# Patient Record
Sex: FEMALE | Race: WHITE | NOT HISPANIC OR LATINO | URBAN - METROPOLITAN AREA
[De-identification: names, ages, dates, MRNs, and addresses within clinical notes are randomized per-mention and may not be internally consistent; named-entity substitution may affect disease eponyms.]

---

## 2019-11-01 ENCOUNTER — EMERGENCY (EMERGENCY)
Facility: HOSPITAL | Age: 34
LOS: 1 days | Discharge: HOME | End: 2019-11-01
Attending: EMERGENCY MEDICINE | Admitting: EMERGENCY MEDICINE
Payer: COMMERCIAL

## 2019-11-01 VITALS
TEMPERATURE: 98 F | WEIGHT: 136.69 LBS | HEART RATE: 77 BPM | RESPIRATION RATE: 16 BRPM | DIASTOLIC BLOOD PRESSURE: 75 MMHG | SYSTOLIC BLOOD PRESSURE: 134 MMHG | OXYGEN SATURATION: 100 % | HEIGHT: 63 IN

## 2019-11-01 LAB
ALBUMIN SERPL ELPH-MCNC: 4.5 G/DL — SIGNIFICANT CHANGE UP (ref 3.4–5)
ALP SERPL-CCNC: 77 U/L — SIGNIFICANT CHANGE UP (ref 40–120)
ALT FLD-CCNC: 14 U/L — SIGNIFICANT CHANGE UP (ref 12–42)
ANION GAP SERPL CALC-SCNC: 10 MMOL/L — SIGNIFICANT CHANGE UP (ref 9–16)
AST SERPL-CCNC: 14 U/L — LOW (ref 15–37)
BASOPHILS # BLD AUTO: 0.05 K/UL — SIGNIFICANT CHANGE UP (ref 0–0.2)
BASOPHILS NFR BLD AUTO: 0.6 % — SIGNIFICANT CHANGE UP (ref 0–2)
BILIRUB SERPL-MCNC: 0.3 MG/DL — SIGNIFICANT CHANGE UP (ref 0.2–1.2)
BUN SERPL-MCNC: 10 MG/DL — SIGNIFICANT CHANGE UP (ref 7–23)
CALCIUM SERPL-MCNC: 10.5 MG/DL — SIGNIFICANT CHANGE UP (ref 8.5–10.5)
CHLORIDE SERPL-SCNC: 104 MMOL/L — SIGNIFICANT CHANGE UP (ref 96–108)
CO2 SERPL-SCNC: 27 MMOL/L — SIGNIFICANT CHANGE UP (ref 22–31)
CREAT SERPL-MCNC: 0.66 MG/DL — SIGNIFICANT CHANGE UP (ref 0.5–1.3)
EOSINOPHIL # BLD AUTO: 0.21 K/UL — SIGNIFICANT CHANGE UP (ref 0–0.5)
EOSINOPHIL NFR BLD AUTO: 2.6 % — SIGNIFICANT CHANGE UP (ref 0–6)
GLUCOSE SERPL-MCNC: 94 MG/DL — SIGNIFICANT CHANGE UP (ref 70–99)
HCG SERPL-ACNC: 67 MIU/ML — HIGH
HCT VFR BLD CALC: 39.5 % — SIGNIFICANT CHANGE UP (ref 34.5–45)
HGB BLD-MCNC: 13 G/DL — SIGNIFICANT CHANGE UP (ref 11.5–15.5)
IMM GRANULOCYTES NFR BLD AUTO: 0.4 % — SIGNIFICANT CHANGE UP (ref 0–1.5)
LYMPHOCYTES # BLD AUTO: 2.76 K/UL — SIGNIFICANT CHANGE UP (ref 1–3.3)
LYMPHOCYTES # BLD AUTO: 34.2 % — SIGNIFICANT CHANGE UP (ref 13–44)
MCHC RBC-ENTMCNC: 30 PG — SIGNIFICANT CHANGE UP (ref 27–34)
MCHC RBC-ENTMCNC: 32.9 GM/DL — SIGNIFICANT CHANGE UP (ref 32–36)
MCV RBC AUTO: 91.2 FL — SIGNIFICANT CHANGE UP (ref 80–100)
MONOCYTES # BLD AUTO: 0.5 K/UL — SIGNIFICANT CHANGE UP (ref 0–0.9)
MONOCYTES NFR BLD AUTO: 6.2 % — SIGNIFICANT CHANGE UP (ref 2–14)
NEUTROPHILS # BLD AUTO: 4.52 K/UL — SIGNIFICANT CHANGE UP (ref 1.8–7.4)
NEUTROPHILS NFR BLD AUTO: 56 % — SIGNIFICANT CHANGE UP (ref 43–77)
NRBC # BLD: 0 /100 WBCS — SIGNIFICANT CHANGE UP (ref 0–0)
PLATELET # BLD AUTO: 209 K/UL — SIGNIFICANT CHANGE UP (ref 150–400)
POTASSIUM SERPL-MCNC: 3.1 MMOL/L — LOW (ref 3.5–5.3)
POTASSIUM SERPL-SCNC: 3.1 MMOL/L — LOW (ref 3.5–5.3)
PROT SERPL-MCNC: 8.5 G/DL — HIGH (ref 6.4–8.2)
RBC # BLD: 4.33 M/UL — SIGNIFICANT CHANGE UP (ref 3.8–5.2)
RBC # FLD: 12.4 % — SIGNIFICANT CHANGE UP (ref 10.3–14.5)
SODIUM SERPL-SCNC: 141 MMOL/L — SIGNIFICANT CHANGE UP (ref 132–145)
WBC # BLD: 8.07 K/UL — SIGNIFICANT CHANGE UP (ref 3.8–10.5)
WBC # FLD AUTO: 8.07 K/UL — SIGNIFICANT CHANGE UP (ref 3.8–10.5)

## 2019-11-01 PROCEDURE — 76815 OB US LIMITED FETUS(S): CPT | Mod: 26

## 2019-11-01 PROCEDURE — 76817 TRANSVAGINAL US OBSTETRIC: CPT | Mod: 26

## 2019-11-01 PROCEDURE — 99284 EMERGENCY DEPT VISIT MOD MDM: CPT

## 2019-11-01 RX ORDER — POTASSIUM CHLORIDE 20 MEQ
60 PACKET (EA) ORAL ONCE
Refills: 0 | Status: COMPLETED | OUTPATIENT
Start: 2019-11-01 | End: 2019-11-01

## 2019-11-01 RX ADMIN — Medication 60 MILLIEQUIVALENT(S): at 22:32

## 2019-11-01 NOTE — ED PROVIDER NOTE - OBJECTIVE STATEMENT
34 yof pw vaginal bleeding since .  LMP ~4-5 wk ago.  pt spoke to her OBGYN, had labs done Monday and Wednesday, noted uptrending hcg but not normal trending, and progesterone level is lower than expected.  pt noted more bleeding w/ clots today.  minimal cramping.  .

## 2019-11-01 NOTE — ED PROVIDER NOTE - PHYSICAL EXAMINATION
Physical Exam  GEN: Awake, alert, non-toxic appearing,   EYES: full EOMI,  ENT: External inspection normal, normal voice,   HEAD: atraumatic  NECK: FROM neck, supple, no meningismus,   RESP: no tachypnea, no hypoxia, no resp distress,  GI:   MSK: FROM all 4 extremities Physical Exam  GEN: Awake, alert, non-toxic appearing,   EYES: full EOMI,  ENT: External inspection normal, normal voice,   HEAD: atraumatic  NECK: FROM neck, supple, no meningismus,   RESP: no tachypnea, no hypoxia, no resp distress,  : chaperoned by RN, small amount of blood noted in vaginal vault, unable to feel OS, no clots on exam, no tenderness  MSK: FROM all 4 extremities

## 2019-11-01 NOTE — ED PROVIDER NOTE - PATIENT PORTAL LINK FT
Left message on machine for patient to return call regarding message below.    Patient will come to this  office to : prescription and form.   Patient was advised of location and hours: Yes.   Patient was advised to bring photo ID: Yes.   Patient elects another party to  item: no.   LMOVM         You can access the FollowMyHealth Patient Portal offered by [facility] by registering at the following website: [email or web address]. By joining [employer]’s FollowMyHealth portal, you will also be able to view your health information using other applications (apps) compatible with our system.

## 2019-11-01 NOTE — ED PROVIDER NOTE - NSFOLLOWUPINSTRUCTIONS_ED_ALL_ED_FT
Follow up with your OBGYN Monday for a repeat lab draw or come to us for repeat lab test (hcg)  Please show a copy of today's result (lab and ultrasound) to your OBGYN  We gave you 60mEq of potassium chloride  Return immediately for any new or worsening symptoms or any new concerns

## 2019-11-01 NOTE — ED ADULT NURSE NOTE - CHIEF COMPLAINT QUOTE
vaginal bleeding since Sunday as per patient she had an early miscarriage and has been under the care of her OBGYN Dr Duke who suggested she come to the ED because she had increased bleeding today. Denies dizziness sob

## 2019-11-01 NOTE — ED ADULT NURSE NOTE - NSIMPLEMENTINTERV_GEN_ALL_ED
Implemented All Universal Safety Interventions:  Blanding to call system. Call bell, personal items and telephone within reach. Instruct patient to call for assistance. Room bathroom lighting operational. Non-slip footwear when patient is off stretcher. Physically safe environment: no spills, clutter or unnecessary equipment. Stretcher in lowest position, wheels locked, appropriate side rails in place.

## 2019-11-02 LAB
BLD GP AB SCN SERPL QL: NEGATIVE — SIGNIFICANT CHANGE UP
BLD GP AB SCN SERPL QL: NEGATIVE — SIGNIFICANT CHANGE UP
RH IG SCN BLD-IMP: POSITIVE — SIGNIFICANT CHANGE UP
RH IG SCN BLD-IMP: POSITIVE — SIGNIFICANT CHANGE UP

## 2019-11-06 DIAGNOSIS — O03.9 COMPLETE OR UNSPECIFIED SPONTANEOUS ABORTION WITHOUT COMPLICATION: ICD-10-CM

## 2019-11-06 DIAGNOSIS — N93.9 ABNORMAL UTERINE AND VAGINAL BLEEDING, UNSPECIFIED: ICD-10-CM

## 2019-11-06 DIAGNOSIS — E87.6 HYPOKALEMIA: ICD-10-CM

## 2020-10-23 ENCOUNTER — INPATIENT (INPATIENT)
Facility: HOSPITAL | Age: 35
LOS: 0 days | Discharge: HOME | End: 2020-10-24
Attending: OBSTETRICS & GYNECOLOGY | Admitting: OBSTETRICS & GYNECOLOGY
Payer: COMMERCIAL

## 2020-10-23 VITALS — WEIGHT: 154.32 LBS | HEIGHT: 63 IN

## 2020-10-23 DIAGNOSIS — O26.899 OTHER SPECIFIED PREGNANCY RELATED CONDITIONS, UNSPECIFIED TRIMESTER: ICD-10-CM

## 2020-10-23 DIAGNOSIS — Z3A.00 WEEKS OF GESTATION OF PREGNANCY NOT SPECIFIED: ICD-10-CM

## 2020-10-23 LAB
BASOPHILS # BLD AUTO: 0.03 K/UL — SIGNIFICANT CHANGE UP (ref 0–0.2)
BASOPHILS NFR BLD AUTO: 0.2 % — SIGNIFICANT CHANGE UP (ref 0–2)
EOSINOPHIL # BLD AUTO: 0.04 K/UL — SIGNIFICANT CHANGE UP (ref 0–0.5)
EOSINOPHIL NFR BLD AUTO: 0.3 % — SIGNIFICANT CHANGE UP (ref 0–6)
HCT VFR BLD CALC: 38.6 % — SIGNIFICANT CHANGE UP (ref 34.5–45)
HGB BLD-MCNC: 12.5 G/DL — SIGNIFICANT CHANGE UP (ref 11.5–15.5)
IMM GRANULOCYTES NFR BLD AUTO: 0.6 % — SIGNIFICANT CHANGE UP (ref 0–1.5)
LYMPHOCYTES # BLD AUTO: 1.75 K/UL — SIGNIFICANT CHANGE UP (ref 1–3.3)
LYMPHOCYTES # BLD AUTO: 13.8 % — SIGNIFICANT CHANGE UP (ref 13–44)
MCHC RBC-ENTMCNC: 30.3 PG — SIGNIFICANT CHANGE UP (ref 27–34)
MCHC RBC-ENTMCNC: 32.4 GM/DL — SIGNIFICANT CHANGE UP (ref 32–36)
MCV RBC AUTO: 93.7 FL — SIGNIFICANT CHANGE UP (ref 80–100)
MONOCYTES # BLD AUTO: 0.6 K/UL — SIGNIFICANT CHANGE UP (ref 0–0.9)
MONOCYTES NFR BLD AUTO: 4.7 % — SIGNIFICANT CHANGE UP (ref 2–14)
NEUTROPHILS # BLD AUTO: 10.17 K/UL — HIGH (ref 1.8–7.4)
NEUTROPHILS NFR BLD AUTO: 80.4 % — HIGH (ref 43–77)
NRBC # BLD: 0 /100 WBCS — SIGNIFICANT CHANGE UP (ref 0–0)
PLATELET # BLD AUTO: 136 K/UL — LOW (ref 150–400)
RBC # BLD: 4.12 M/UL — SIGNIFICANT CHANGE UP (ref 3.8–5.2)
RBC # FLD: 14.3 % — SIGNIFICANT CHANGE UP (ref 10.3–14.5)
SARS-COV-2 IGG SERPL QL IA: NEGATIVE — SIGNIFICANT CHANGE UP
SARS-COV-2 IGM SERPL IA-ACNC: <0.1 INDEX — SIGNIFICANT CHANGE UP
SARS-COV-2 RNA SPEC QL NAA+PROBE: SIGNIFICANT CHANGE UP
T PALLIDUM AB TITR SER: NEGATIVE — SIGNIFICANT CHANGE UP
WBC # BLD: 12.66 K/UL — HIGH (ref 3.8–10.5)
WBC # FLD AUTO: 12.66 K/UL — HIGH (ref 3.8–10.5)

## 2020-10-23 PROCEDURE — 88307 TISSUE EXAM BY PATHOLOGIST: CPT | Mod: 26

## 2020-10-23 RX ORDER — OXYCODONE HYDROCHLORIDE 5 MG/1
5 TABLET ORAL
Refills: 0 | Status: DISCONTINUED | OUTPATIENT
Start: 2020-10-23 | End: 2020-10-24

## 2020-10-23 RX ORDER — OXYTOCIN 10 UNIT/ML
333.33 VIAL (ML) INJECTION
Qty: 20 | Refills: 0 | Status: DISCONTINUED | OUTPATIENT
Start: 2020-10-23 | End: 2020-10-23

## 2020-10-23 RX ORDER — DIBUCAINE 1 %
1 OINTMENT (GRAM) RECTAL EVERY 6 HOURS
Refills: 0 | Status: DISCONTINUED | OUTPATIENT
Start: 2020-10-23 | End: 2020-10-24

## 2020-10-23 RX ORDER — FENTANYL/BUPIVACAINE/NS/PF 2MCG/ML-.1
250 PLASTIC BAG, INJECTION (ML) INJECTION
Refills: 0 | Status: DISCONTINUED | OUTPATIENT
Start: 2020-10-23 | End: 2020-10-24

## 2020-10-23 RX ORDER — DIPHENHYDRAMINE HCL 50 MG
25 CAPSULE ORAL EVERY 6 HOURS
Refills: 0 | Status: DISCONTINUED | OUTPATIENT
Start: 2020-10-23 | End: 2020-10-24

## 2020-10-23 RX ORDER — IBUPROFEN 200 MG
600 TABLET ORAL EVERY 6 HOURS
Refills: 0 | Status: DISCONTINUED | OUTPATIENT
Start: 2020-10-23 | End: 2020-10-24

## 2020-10-23 RX ORDER — SODIUM CHLORIDE 9 MG/ML
3 INJECTION INTRAMUSCULAR; INTRAVENOUS; SUBCUTANEOUS EVERY 8 HOURS
Refills: 0 | Status: DISCONTINUED | OUTPATIENT
Start: 2020-10-23 | End: 2020-10-24

## 2020-10-23 RX ORDER — IBUPROFEN 200 MG
600 TABLET ORAL EVERY 6 HOURS
Refills: 0 | Status: COMPLETED | OUTPATIENT
Start: 2020-10-23 | End: 2021-09-21

## 2020-10-23 RX ORDER — PRAMOXINE HYDROCHLORIDE 150 MG/15G
1 AEROSOL, FOAM RECTAL EVERY 4 HOURS
Refills: 0 | Status: DISCONTINUED | OUTPATIENT
Start: 2020-10-23 | End: 2020-10-24

## 2020-10-23 RX ORDER — KETOROLAC TROMETHAMINE 30 MG/ML
30 SYRINGE (ML) INJECTION ONCE
Refills: 0 | Status: DISCONTINUED | OUTPATIENT
Start: 2020-10-23 | End: 2020-10-23

## 2020-10-23 RX ORDER — SODIUM CHLORIDE 9 MG/ML
1000 INJECTION, SOLUTION INTRAVENOUS
Refills: 0 | Status: DISCONTINUED | OUTPATIENT
Start: 2020-10-23 | End: 2020-10-23

## 2020-10-23 RX ORDER — LANOLIN
1 OINTMENT (GRAM) TOPICAL EVERY 6 HOURS
Refills: 0 | Status: DISCONTINUED | OUTPATIENT
Start: 2020-10-23 | End: 2020-10-24

## 2020-10-23 RX ORDER — SIMETHICONE 80 MG/1
80 TABLET, CHEWABLE ORAL EVERY 4 HOURS
Refills: 0 | Status: DISCONTINUED | OUTPATIENT
Start: 2020-10-23 | End: 2020-10-24

## 2020-10-23 RX ORDER — BENZOCAINE 10 %
1 GEL (GRAM) MUCOUS MEMBRANE EVERY 6 HOURS
Refills: 0 | Status: DISCONTINUED | OUTPATIENT
Start: 2020-10-23 | End: 2020-10-24

## 2020-10-23 RX ORDER — MAGNESIUM HYDROXIDE 400 MG/1
30 TABLET, CHEWABLE ORAL
Refills: 0 | Status: DISCONTINUED | OUTPATIENT
Start: 2020-10-23 | End: 2020-10-24

## 2020-10-23 RX ORDER — OXYCODONE HYDROCHLORIDE 5 MG/1
5 TABLET ORAL ONCE
Refills: 0 | Status: DISCONTINUED | OUTPATIENT
Start: 2020-10-23 | End: 2020-10-24

## 2020-10-23 RX ORDER — TETANUS TOXOID, REDUCED DIPHTHERIA TOXOID AND ACELLULAR PERTUSSIS VACCINE, ADSORBED 5; 2.5; 8; 8; 2.5 [IU]/.5ML; [IU]/.5ML; UG/.5ML; UG/.5ML; UG/.5ML
0.5 SUSPENSION INTRAMUSCULAR ONCE
Refills: 0 | Status: DISCONTINUED | OUTPATIENT
Start: 2020-10-23 | End: 2020-10-24

## 2020-10-23 RX ORDER — OXYTOCIN 10 UNIT/ML
333.33 VIAL (ML) INJECTION
Qty: 20 | Refills: 0 | Status: DISCONTINUED | OUTPATIENT
Start: 2020-10-23 | End: 2020-10-24

## 2020-10-23 RX ORDER — AER TRAVELER 0.5 G/1
1 SOLUTION RECTAL; TOPICAL EVERY 4 HOURS
Refills: 0 | Status: DISCONTINUED | OUTPATIENT
Start: 2020-10-23 | End: 2020-10-24

## 2020-10-23 RX ORDER — HYDROCORTISONE 1 %
1 OINTMENT (GRAM) TOPICAL EVERY 6 HOURS
Refills: 0 | Status: DISCONTINUED | OUTPATIENT
Start: 2020-10-23 | End: 2020-10-24

## 2020-10-23 RX ORDER — ACETAMINOPHEN 500 MG
975 TABLET ORAL
Refills: 0 | Status: DISCONTINUED | OUTPATIENT
Start: 2020-10-23 | End: 2020-10-24

## 2020-10-23 RX ORDER — CITRIC ACID/SODIUM CITRATE 300-500 MG
15 SOLUTION, ORAL ORAL ONCE
Refills: 0 | Status: DISCONTINUED | OUTPATIENT
Start: 2020-10-23 | End: 2020-10-23

## 2020-10-23 RX ADMIN — SODIUM CHLORIDE 3 MILLILITER(S): 9 INJECTION INTRAMUSCULAR; INTRAVENOUS; SUBCUTANEOUS at 23:24

## 2020-10-23 RX ADMIN — SODIUM CHLORIDE 3 MILLILITER(S): 9 INJECTION INTRAMUSCULAR; INTRAVENOUS; SUBCUTANEOUS at 14:21

## 2020-10-23 RX ADMIN — Medication 30 MILLIGRAM(S): at 14:21

## 2020-10-23 RX ADMIN — Medication 975 MILLIGRAM(S): at 22:35

## 2020-10-23 RX ADMIN — Medication 1000 MILLIUNIT(S)/MIN: at 13:19

## 2020-10-23 RX ADMIN — Medication 600 MILLIGRAM(S): at 23:24

## 2020-10-23 RX ADMIN — SODIUM CHLORIDE 125 MILLILITER(S): 9 INJECTION, SOLUTION INTRAVENOUS at 06:26

## 2020-10-23 RX ADMIN — Medication 30 MILLIGRAM(S): at 13:25

## 2020-10-23 RX ADMIN — Medication 975 MILLIGRAM(S): at 21:39

## 2020-10-23 NOTE — LACTATION INITIAL EVALUATION - NS LACT CON REASON FOR REQ
Infant about 10 hours old at time of consult. Mother is now P1. Infant is a 39.3 wk female delivered vaginally. Infant has voided but is due to stool. Mother stated that infant has latched and fed but becomes sleepy at the breast. Infant was due to eat at the time of this visit and was undressed down to the diaper. Assisted with latching infant via the cross cradle hold on the left breast. Infant observed to have a good latch with a wide gape, lips flanged and an organized, rhythmic suck. Demonstrated stimulating infant to suck when she became sleepy. Reviewed breastfeeding education. Emphasized importance of responding to feeding cues, importance of skin to skin and the importance of a proper latch. Demonstrated breast massage and hand expression and mother was able to return demo with colostrum noted. Reviewed pertinent information in the "A New Beginning" booklet. Mother has a breast pump for home use. Mother verbalized understanding of all information and denied having questions/concerns at this time. Informed about lactation availability.

## 2020-10-24 ENCOUNTER — TRANSCRIPTION ENCOUNTER (OUTPATIENT)
Age: 35
End: 2020-10-24

## 2020-10-24 VITALS
TEMPERATURE: 98 F | RESPIRATION RATE: 18 BRPM | OXYGEN SATURATION: 99 % | HEART RATE: 74 BPM | SYSTOLIC BLOOD PRESSURE: 110 MMHG | DIASTOLIC BLOOD PRESSURE: 78 MMHG

## 2020-10-24 PROCEDURE — 99214 OFFICE O/P EST MOD 30 MIN: CPT

## 2020-10-24 PROCEDURE — 85025 COMPLETE CBC W/AUTO DIFF WBC: CPT

## 2020-10-24 PROCEDURE — 86901 BLOOD TYPING SEROLOGIC RH(D): CPT

## 2020-10-24 PROCEDURE — 36415 COLL VENOUS BLD VENIPUNCTURE: CPT

## 2020-10-24 PROCEDURE — 86780 TREPONEMA PALLIDUM: CPT

## 2020-10-24 PROCEDURE — 86900 BLOOD TYPING SEROLOGIC ABO: CPT

## 2020-10-24 PROCEDURE — 86769 SARS-COV-2 COVID-19 ANTIBODY: CPT

## 2020-10-24 PROCEDURE — 88307 TISSUE EXAM BY PATHOLOGIST: CPT

## 2020-10-24 PROCEDURE — U0003: CPT

## 2020-10-24 PROCEDURE — 86850 RBC ANTIBODY SCREEN: CPT

## 2020-10-24 RX ORDER — IBUPROFEN 200 MG
1 TABLET ORAL
Qty: 0 | Refills: 0 | DISCHARGE
Start: 2020-10-24

## 2020-10-24 RX ORDER — ACETAMINOPHEN 500 MG
3 TABLET ORAL
Qty: 0 | Refills: 0 | DISCHARGE
Start: 2020-10-24

## 2020-10-24 RX ADMIN — Medication 600 MILLIGRAM(S): at 00:36

## 2020-10-24 RX ADMIN — Medication 600 MILLIGRAM(S): at 06:31

## 2020-10-24 RX ADMIN — SODIUM CHLORIDE 3 MILLILITER(S): 9 INJECTION INTRAMUSCULAR; INTRAVENOUS; SUBCUTANEOUS at 05:36

## 2020-10-24 RX ADMIN — Medication 600 MILLIGRAM(S): at 06:02

## 2020-10-24 NOTE — DISCHARGE NOTE OB - PATIENT PORTAL LINK FT
You can access the FollowMyHealth Patient Portal offered by [facility] by registering at the following website: [email or web address]. By joining [email or web address]’s FollowMyHealth portal, you will also be able to view your health information using other applications (apps) compatible with our system.

## 2020-10-24 NOTE — PROGRESS NOTE ADULT - SUBJECTIVE AND OBJECTIVE BOX
Patient is a 35y y.o. F now PPD #1 s/p .    NAEO. Patient was evaluated at bedside this AM. Pain is well-controlled with PO pain medications. She has been ambulating without difficulty and voiding spontaneously. She endorses decreasing vaginal bleeding.    Vital Signs Last 24 Hrs  T(C): 36.6 (24 Oct 2020 05:52), Max: 37.2 (23 Oct 2020 11:05)  T(F): 97.9 (24 Oct 2020 05:52), Max: 98.9 (23 Oct 2020 11:05)  HR: 62 (24 Oct 2020 05:52) (58 - 85)  BP: 109/66 (24 Oct 2020 05:52) (96/58 - 120/67)  BP(mean): --  RR: 17 (24 Oct 2020 05:52) (17 - 18)  SpO2: 96% (24 Oct 2020 05:52) (96% - 99%)    Physical Exam  Gen: Well-appearing. No acute distress. Resting comfortably in bed.  Resp: Breathing comfortably on RA.  Abd: Soft, non-tender, non-distended. Uterus firm at umbilicus.  Extremities: No calf tenderness.    Labs                          12.5   12.66 )-----------( 136      ( 23 Oct 2020 05:50 )             38.6                   10-22-20 @ 07:  -  10-23-20 @ 07:00  --------------------------------------------------------  IN: 2000 mL / OUT: 0 mL / NET: 2000 mL    10-23-20 @ 07:01  -  10-24-20 @ 06:02  --------------------------------------------------------  IN: 0 mL / OUT: 1850 mL / NET: -1850 mL

## 2020-10-24 NOTE — DISCHARGE NOTE OB - HOSPITAL COURSE
Pt is a 35yF s/p .  Please see delivery note for details.  During postpartum course patient's vitals were stable, vaginal bleeding appropriate, and pain well controlled.  On day of discharge patient was ambulating, pt had adequate oral intake, and was voiding freely.  Discharge instructions and precautions were given.  Will return to clinic in 4 weeks for postpartum visit.

## 2020-10-24 NOTE — DISCHARGE NOTE OB - MEDICATION SUMMARY - MEDICATIONS TO TAKE
I will START or STAY ON the medications listed below when I get home from the hospital:    acetaminophen 325 mg oral tablet  -- 3 tab(s) by mouth   -- Indication: For Pain    ibuprofen 600 mg oral tablet  -- 1 tab(s) by mouth every 6 hours  -- Indication: For Pain    Prenatal Multivitamins oral tablet  -- 1 tab(s) by mouth once a day  -- Indication: For Postpartum state

## 2020-10-24 NOTE — DISCHARGE NOTE OB - CARE PROVIDER_API CALL
Faye Batista)  Obstetrics and Gynecology  [address] [place] [id number]  Phone: [phone]  Fax: [phone]  Follow Up Time:

## 2020-10-24 NOTE — DISCHARGE NOTE OB - CARE PLAN
Principal Discharge DX:	Postpartum state  Goal:	Happy and healthy mom and baby!  Assessment and plan of treatment:	Please follow-up with your OB doctor within 4-6 weeks. You can resume a regular diet at home and you should continue your prenatal vitamins as directed. You can take Motrin 600mg by mouth every 6 hours for pain as needed.    Please place nothing in the vagina for 6 weeks (no tampons, no sex, no douching, no baths, no hot tubs, no swimming pools, etc). If you have severe headaches and/or vision changes, heavy bleeding, chest pain, or shortness of breath, please call your provider or go to the nearest ED. Call your OB with any signs of symptoms of infection including fever > 100.4 degrees, severe pain, malodorous vaginal discharge or heavy bleeding requiring more than 1-2 pads/hour.

## 2020-10-24 NOTE — PROGRESS NOTE ADULT - ASSESSMENT
Assessment/Plan    35y y.o. now PPD#1 s/p . AfVSS. Patient is progressing toward all postpartum milestones. Pain is well-controlled on PO medications. Anticipate discharge today or tomorrow.    1. Pain  - Well-controlled on Motrin and Tylenol ATC    2. GI  - Tolerating regular diet without n/v  - Senna PRN    3.   - Voiding spontaneously without difficulty/pain    4. DVT prophylaxis  - Encouraging ambulation    5. Dispo  - Anticipate dispo PPD#1        Isha Celeste MD PGY1

## 2020-10-28 DIAGNOSIS — Z3A.39 39 WEEKS GESTATION OF PREGNANCY: ICD-10-CM

## 2020-10-28 LAB — SURGICAL PATHOLOGY STUDY: SIGNIFICANT CHANGE UP

## 2021-01-19 NOTE — ED ADULT TRIAGE NOTE - HEART RATE (BEATS/MIN)
Pt to ED c/o chills, body aches, congestions, nausea, vomiting, sob, cough since last week, father dx covid last week. otc meds not working. Pt states a little blood in vomit.   77

## 2022-05-17 NOTE — ED ADULT NURSE NOTE - BREATHING, MLM
Problem: Plan of Care - These are the overarching goals to be used throughout the patient stay.    Goal: Plan of Care Review/Shift Note  Description: The Plan of Care Review/Shift note should be completed every shift.  The Outcome Evaluation is a brief statement about your assessment that the patient is improving, declining, or no change.  This information will be displayed automatically on your shift note.  Outcome: Adequate for Care Transition  Flowsheets (Taken 5/17/2022 1217)  Plan of Care Reviewed With:   patient   family  Goal: Absence of Hospital-Acquired Illness or Injury  Intervention: Identify and Manage Fall Risk  Recent Flowsheet Documentation  Taken 5/17/2022 1046 by Nadege Rosa, RN  Safety Promotion/Fall Prevention: activity supervised  Intervention: Prevent Skin Injury  Recent Flowsheet Documentation  Taken 5/17/2022 1046 by Nadege Rosa, RN  Body Position:   position maintained   position changed independently   Goal Outcome Evaluation:    Plan of Care Reviewed With: patient, family      Patient discharging today to [facility]. Had a BP 77/52 @ 1100 this morning, encourage fluids,  rechecked 11:30 114/66. Patient was asymptomatic. Daughter her and updated. They took patients discharge summary with them                 Spontaneous, unlabored and symmetrical

## 2024-01-26 ENCOUNTER — OFFICE (OUTPATIENT)
Dept: URBAN - METROPOLITAN AREA CLINIC 103 | Facility: CLINIC | Age: 39
Setting detail: OPHTHALMOLOGY
End: 2024-01-26
Payer: COMMERCIAL

## 2024-01-26 DIAGNOSIS — H16.042: ICD-10-CM

## 2024-01-26 PROCEDURE — 92012 INTRM OPH EXAM EST PATIENT: CPT | Performed by: OPHTHALMOLOGY

## 2024-01-26 ASSESSMENT — DRY EYES - PHYSICIAN NOTES: OD_GENERALCOMMENTS: INFERIOR

## 2024-01-26 ASSESSMENT — REFRACTION_CURRENTRX
OS_AXIS: 180
OS_SPHERE: -10.00
OS_CYLINDER: 0.00
OD_SPHERE: -9.00
OD_CYLINDER: -0.25
OS_VPRISM_DIRECTION: SV
OS_OVR_VA: 20/
OD_OVR_VA: 20/
OD_VPRISM_DIRECTION: SV
OD_AXIS: 152

## 2024-01-26 ASSESSMENT — REFRACTION_AUTOREFRACTION
OS_CYLINDER: -0.50
OS_SPHERE: -10.00
OD_SPHERE: -7.75
OS_AXIS: 155
OD_AXIS: 021
OD_CYLINDER: -0.25

## 2024-01-26 ASSESSMENT — SUPERFICIAL PUNCTATE KERATITIS (SPK)
OD_SPK: 1+
OS_SPK: ABSENT

## 2024-01-26 ASSESSMENT — CONFRONTATIONAL VISUAL FIELD TEST (CVF)
OD_FINDINGS: FULL
OS_FINDINGS: FULL

## 2024-01-26 ASSESSMENT — SPHEQUIV_DERIVED
OS_SPHEQUIV: -10.25
OD_SPHEQUIV: -7.875

## 2024-01-29 ENCOUNTER — RX ONLY (RX ONLY)
Age: 39
End: 2024-01-29

## 2024-01-29 ENCOUNTER — OFFICE (OUTPATIENT)
Dept: URBAN - METROPOLITAN AREA CLINIC 105 | Facility: CLINIC | Age: 39
Setting detail: OPHTHALMOLOGY
End: 2024-01-29
Payer: COMMERCIAL

## 2024-01-29 DIAGNOSIS — H16.042: ICD-10-CM

## 2024-01-29 PROCEDURE — 92012 INTRM OPH EXAM EST PATIENT: CPT | Performed by: OPHTHALMOLOGY

## 2024-01-29 ASSESSMENT — REFRACTION_CURRENTRX
OD_VPRISM_DIRECTION: SV
OS_VPRISM_DIRECTION: SV
OS_AXIS: 180
OD_AXIS: 152
OS_OVR_VA: 20/
OD_SPHERE: -9.00
OD_CYLINDER: -0.25
OD_OVR_VA: 20/
OS_CYLINDER: 0.00
OS_SPHERE: -10.00

## 2024-01-29 ASSESSMENT — REFRACTION_AUTOREFRACTION
OD_SPHERE: -8.50
OD_AXIS: 050
OS_SPHERE: -10.75
OS_CYLINDER: -0.75
OS_AXIS: 162
OD_CYLINDER: -0.25

## 2024-01-29 ASSESSMENT — SUPERFICIAL PUNCTATE KERATITIS (SPK)
OD_SPK: 1+
OS_SPK: ABSENT

## 2024-01-29 ASSESSMENT — SPHEQUIV_DERIVED
OS_SPHEQUIV: -11.125
OD_SPHEQUIV: -8.625

## 2024-01-29 ASSESSMENT — CONFRONTATIONAL VISUAL FIELD TEST (CVF)
OD_FINDINGS: FULL
OS_FINDINGS: FULL

## 2024-01-29 ASSESSMENT — DRY EYES - PHYSICIAN NOTES: OD_GENERALCOMMENTS: INFERIOR

## 2024-02-06 ENCOUNTER — OFFICE (OUTPATIENT)
Dept: URBAN - METROPOLITAN AREA CLINIC 103 | Facility: CLINIC | Age: 39
Setting detail: OPHTHALMOLOGY
End: 2024-02-06
Payer: COMMERCIAL

## 2024-02-06 DIAGNOSIS — H16.042: ICD-10-CM

## 2024-02-06 DIAGNOSIS — H17.9: ICD-10-CM

## 2024-02-06 PROCEDURE — 92012 INTRM OPH EXAM EST PATIENT: CPT | Performed by: OPHTHALMOLOGY

## 2024-02-06 ASSESSMENT — REFRACTION_CURRENTRX
OS_AXIS: 095
OS_OVR_VA: 20/
OS_CYLINDER: 0.00
OD_CYLINDER: -0.75
OD_OVR_VA: 20/
OS_VPRISM_DIRECTION: SV
OS_CYLINDER: -1.00
OD_SPHERE: -9.00
OD_VPRISM_DIRECTION: SV
OS_VPRISM_DIRECTION: SV
OS_OVR_VA: 20/
OS_SPHERE: -10.00
OS_AXIS: 180
OD_AXIS: 042
OD_VPRISM_DIRECTION: SV
OD_AXIS: 152
OS_SPHERE: -10.50
OD_CYLINDER: -0.25
OD_OVR_VA: 20/
OD_SPHERE: -9.00

## 2024-02-06 ASSESSMENT — SPHEQUIV_DERIVED: OS_SPHEQUIV: -10.375

## 2024-02-06 ASSESSMENT — REFRACTION_AUTOREFRACTION
OS_AXIS: 172
OS_SPHERE: -10.00
OD_CYLINDER: SPHERE
OS_CYLINDER: -0.75
OD_SPHERE: -7.75

## 2024-02-06 ASSESSMENT — CONFRONTATIONAL VISUAL FIELD TEST (CVF)
OD_FINDINGS: FULL
OS_FINDINGS: FULL

## 2024-02-06 ASSESSMENT — DRY EYES - PHYSICIAN NOTES: OD_GENERALCOMMENTS: INFERIOR

## 2024-02-06 ASSESSMENT — SUPERFICIAL PUNCTATE KERATITIS (SPK)
OD_SPK: 1+
OS_SPK: ABSENT

## 2024-02-24 ENCOUNTER — OFFICE (OUTPATIENT)
Dept: URBAN - METROPOLITAN AREA CLINIC 12 | Facility: CLINIC | Age: 39
Setting detail: OPHTHALMOLOGY
End: 2024-02-24
Payer: COMMERCIAL

## 2024-02-24 DIAGNOSIS — H16.042: ICD-10-CM

## 2024-02-24 PROCEDURE — 99214 OFFICE O/P EST MOD 30 MIN: CPT | Performed by: STUDENT IN AN ORGANIZED HEALTH CARE EDUCATION/TRAINING PROGRAM

## 2024-02-24 ASSESSMENT — REFRACTION_AUTOREFRACTION
OD_AXIS: 015
OS_AXIS: 012
OD_CYLINDER: -0.25
OS_SPHERE: -11.25
OD_SPHERE: -8.75
OS_CYLINDER: -0.25

## 2024-02-24 ASSESSMENT — REFRACTION_CURRENTRX
OD_VPRISM_DIRECTION: SV
OS_VPRISM_DIRECTION: SV
OS_AXIS: 180
OS_VPRISM_DIRECTION: SV
OD_AXIS: 152
OD_VPRISM_DIRECTION: SV
OD_SPHERE: -9.00
OD_AXIS: 042
OD_SPHERE: -9.00
OS_CYLINDER: 0.00
OD_CYLINDER: -0.25
OS_SPHERE: -10.50
OS_OVR_VA: 20/
OS_CYLINDER: -1.00
OD_OVR_VA: 20/
OD_OVR_VA: 20/
OS_SPHERE: -10.00
OS_AXIS: 095
OD_CYLINDER: -0.75
OS_OVR_VA: 20/

## 2024-02-24 ASSESSMENT — SUPERFICIAL PUNCTATE KERATITIS (SPK)
OD_SPK: 1+
OS_SPK: ABSENT

## 2024-02-24 ASSESSMENT — CONFRONTATIONAL VISUAL FIELD TEST (CVF)
OD_FINDINGS: FULL
OS_FINDINGS: FULL

## 2024-02-24 ASSESSMENT — DRY EYES - PHYSICIAN NOTES: OD_GENERALCOMMENTS: INFERIOR

## 2024-02-24 ASSESSMENT — SPHEQUIV_DERIVED
OS_SPHEQUIV: -11.375
OD_SPHEQUIV: -8.875

## 2024-02-25 ENCOUNTER — OFFICE (OUTPATIENT)
Dept: URBAN - METROPOLITAN AREA CLINIC 12 | Facility: CLINIC | Age: 39
Setting detail: OPHTHALMOLOGY
End: 2024-02-25
Payer: COMMERCIAL

## 2024-02-25 DIAGNOSIS — H16.042: ICD-10-CM

## 2024-02-25 PROCEDURE — 99213 OFFICE O/P EST LOW 20 MIN: CPT | Performed by: STUDENT IN AN ORGANIZED HEALTH CARE EDUCATION/TRAINING PROGRAM

## 2024-02-25 ASSESSMENT — SUPERFICIAL PUNCTATE KERATITIS (SPK)
OS_SPK: ABSENT
OD_SPK: 1+

## 2024-02-25 ASSESSMENT — REFRACTION_CURRENTRX
OS_AXIS: 095
OS_OVR_VA: 20/
OD_OVR_VA: 20/
OD_SPHERE: -9.00
OS_OVR_VA: 20/
OS_CYLINDER: -1.00
OD_CYLINDER: -0.25
OS_VPRISM_DIRECTION: SV
OD_VPRISM_DIRECTION: SV
OS_VPRISM_DIRECTION: SV
OD_CYLINDER: -0.75
OD_AXIS: 152
OD_SPHERE: -9.00
OS_AXIS: 180
OS_SPHERE: -10.50
OD_AXIS: 042
OD_VPRISM_DIRECTION: SV
OS_CYLINDER: 0.00
OD_OVR_VA: 20/
OS_SPHERE: -10.00

## 2024-02-25 ASSESSMENT — REFRACTION_AUTOREFRACTION
OD_SPHERE: -8.50
OD_CYLINDER: -0.25
OS_AXIS: 174
OS_SPHERE: -11.25
OS_CYLINDER: -0.75
OD_AXIS: 014

## 2024-02-25 ASSESSMENT — SPHEQUIV_DERIVED
OD_SPHEQUIV: -8.625
OS_SPHEQUIV: -11.625

## 2024-02-25 ASSESSMENT — CONFRONTATIONAL VISUAL FIELD TEST (CVF)
OS_FINDINGS: FULL
OD_FINDINGS: FULL

## 2024-02-25 ASSESSMENT — DRY EYES - PHYSICIAN NOTES: OD_GENERALCOMMENTS: INFERIOR

## 2024-02-26 ENCOUNTER — OFFICE (OUTPATIENT)
Dept: URBAN - METROPOLITAN AREA CLINIC 12 | Facility: CLINIC | Age: 39
Setting detail: OPHTHALMOLOGY
End: 2024-02-26
Payer: COMMERCIAL

## 2024-02-26 VITALS — WEIGHT: 120 LBS | HEIGHT: 63 IN | BODY MASS INDEX: 21.26 KG/M2

## 2024-02-26 DIAGNOSIS — H16.042: ICD-10-CM

## 2024-02-26 PROCEDURE — 99213 OFFICE O/P EST LOW 20 MIN: CPT | Performed by: STUDENT IN AN ORGANIZED HEALTH CARE EDUCATION/TRAINING PROGRAM

## 2024-02-26 ASSESSMENT — SUPERFICIAL PUNCTATE KERATITIS (SPK)
OD_SPK: 1+
OS_SPK: ABSENT

## 2024-02-26 ASSESSMENT — REFRACTION_CURRENTRX
OD_OVR_VA: 20/
OS_AXIS: 095
OS_CYLINDER: -1.00
OD_SPHERE: -9.00
OD_AXIS: 042
OS_SPHERE: -10.50
OS_OVR_VA: 20/
OD_CYLINDER: -0.75
OD_CYLINDER: -0.75
OS_CYLINDER: -1.00
OD_VPRISM_DIRECTION: SV
OS_AXIS: 095
OS_SPHERE: -11.00
OD_OVR_VA: 20/
OS_VPRISM_DIRECTION: SV
OD_AXIS: 046
OS_OVR_VA: 20/
OD_SPHERE: -9.25
OD_VPRISM_DIRECTION: SV
OS_VPRISM_DIRECTION: SV

## 2024-02-26 ASSESSMENT — REFRACTION_AUTOREFRACTION
OD_SPHERE: -8.75
OS_SPHERE: -10.75
OS_CYLINDER: -1.00
OD_AXIS: 022
OD_CYLINDER: -0.25
OS_AXIS: 165

## 2024-02-26 ASSESSMENT — CONFRONTATIONAL VISUAL FIELD TEST (CVF)
OD_FINDINGS: FULL
OS_FINDINGS: FULL

## 2024-02-26 ASSESSMENT — DRY EYES - PHYSICIAN NOTES: OD_GENERALCOMMENTS: INFERIOR

## 2024-02-26 ASSESSMENT — SPHEQUIV_DERIVED
OD_SPHEQUIV: -8.875
OS_SPHEQUIV: -11.25

## 2024-02-27 ENCOUNTER — OFFICE (OUTPATIENT)
Dept: URBAN - METROPOLITAN AREA CLINIC 6 | Facility: CLINIC | Age: 39
Setting detail: OPHTHALMOLOGY
End: 2024-02-27
Payer: COMMERCIAL

## 2024-02-27 ENCOUNTER — NON-APPOINTMENT (OUTPATIENT)
Age: 39
End: 2024-02-27

## 2024-02-27 DIAGNOSIS — H16.042: ICD-10-CM

## 2024-02-27 PROCEDURE — 99213 OFFICE O/P EST LOW 20 MIN: CPT | Performed by: OPHTHALMOLOGY

## 2024-02-27 PROCEDURE — 92285 EXTERNAL OCULAR PHOTOGRAPHY: CPT | Performed by: OPHTHALMOLOGY

## 2024-02-27 ASSESSMENT — REFRACTION_CURRENTRX
OD_SPHERE: -9.25
OD_CYLINDER: -0.75
OD_VPRISM_DIRECTION: SV
OD_OVR_VA: 20/
OD_AXIS: 046
OS_OVR_VA: 20/
OS_VPRISM_DIRECTION: SV
OD_VPRISM_DIRECTION: SV
OS_CYLINDER: -1.00
OS_CYLINDER: -1.00
OS_SPHERE: -11.00
OD_SPHERE: -9.00
OS_OVR_VA: 20/
OS_SPHERE: -10.50
OS_AXIS: 095
OD_AXIS: 042
OS_VPRISM_DIRECTION: SV
OD_OVR_VA: 20/
OD_CYLINDER: -0.75
OS_AXIS: 095

## 2024-02-27 ASSESSMENT — CONFRONTATIONAL VISUAL FIELD TEST (CVF)
OD_FINDINGS: FULL
OS_FINDINGS: FULL

## 2024-02-27 ASSESSMENT — REFRACTION_AUTOREFRACTION
OD_SPHERE: -9.00
OS_CYLINDER: -1.00
OD_AXIS: 000
OD_CYLINDER: SPHERE
OS_AXIS: 170
OS_SPHERE: -10.75

## 2024-02-27 ASSESSMENT — DRY EYES - PHYSICIAN NOTES
OD_GENERALCOMMENTS: INFERIOR
OS_GENERALCOMMENTS: INFERIOR

## 2024-02-27 ASSESSMENT — SPHEQUIV_DERIVED: OS_SPHEQUIV: -11.25

## 2024-02-28 ENCOUNTER — APPOINTMENT (OUTPATIENT)
Dept: OPHTHALMOLOGY | Facility: CLINIC | Age: 39
End: 2024-02-28
Payer: COMMERCIAL

## 2024-02-28 PROCEDURE — 92002 INTRM OPH EXAM NEW PATIENT: CPT

## 2024-03-01 ENCOUNTER — RX ONLY (RX ONLY)
Age: 39
End: 2024-03-01

## 2024-03-01 ENCOUNTER — OFFICE (OUTPATIENT)
Dept: URBAN - METROPOLITAN AREA CLINIC 6 | Facility: CLINIC | Age: 39
Setting detail: OPHTHALMOLOGY
End: 2024-03-01
Payer: COMMERCIAL

## 2024-03-01 DIAGNOSIS — H16.042: ICD-10-CM

## 2024-03-01 PROBLEM — H17.9 CORNEAL SCAR ; LEFT EYE: Status: ACTIVE | Noted: 2024-02-06

## 2024-03-01 PROCEDURE — 99213 OFFICE O/P EST LOW 20 MIN: CPT | Performed by: OPHTHALMOLOGY

## 2024-03-01 ASSESSMENT — REFRACTION_CURRENTRX
OS_VPRISM_DIRECTION: SV
OD_CYLINDER: -0.75
OD_CYLINDER: -0.75
OD_VPRISM_DIRECTION: SV
OS_OVR_VA: 20/
OD_AXIS: 042
OS_VPRISM_DIRECTION: SV
OS_SPHERE: -10.50
OS_AXIS: 095
OD_SPHERE: -9.25
OD_AXIS: 046
OD_OVR_VA: 20/
OS_CYLINDER: -1.00
OS_SPHERE: -11.00
OD_VPRISM_DIRECTION: SV
OD_OVR_VA: 20/
OS_OVR_VA: 20/
OD_SPHERE: -9.00
OS_AXIS: 095
OS_CYLINDER: -1.00

## 2024-03-05 ENCOUNTER — APPOINTMENT (OUTPATIENT)
Dept: OPHTHALMOLOGY | Facility: CLINIC | Age: 39
End: 2024-03-05
Payer: COMMERCIAL

## 2024-03-05 ENCOUNTER — NON-APPOINTMENT (OUTPATIENT)
Age: 39
End: 2024-03-05

## 2024-03-05 PROCEDURE — 92012 INTRM OPH EXAM EST PATIENT: CPT

## 2024-03-08 ENCOUNTER — APPOINTMENT (OUTPATIENT)
Dept: OPHTHALMOLOGY | Facility: CLINIC | Age: 39
End: 2024-03-08

## 2024-03-12 ENCOUNTER — NON-APPOINTMENT (OUTPATIENT)
Age: 39
End: 2024-03-12

## 2024-03-12 ENCOUNTER — APPOINTMENT (OUTPATIENT)
Dept: OPHTHALMOLOGY | Facility: CLINIC | Age: 39
End: 2024-03-12
Payer: COMMERCIAL

## 2024-03-12 PROCEDURE — 92012 INTRM OPH EXAM EST PATIENT: CPT

## 2024-03-26 ENCOUNTER — APPOINTMENT (OUTPATIENT)
Dept: OPHTHALMOLOGY | Facility: CLINIC | Age: 39
End: 2024-03-26
Payer: COMMERCIAL

## 2024-03-26 ENCOUNTER — NON-APPOINTMENT (OUTPATIENT)
Age: 39
End: 2024-03-26

## 2024-03-26 PROCEDURE — 92012 INTRM OPH EXAM EST PATIENT: CPT

## 2024-04-10 ENCOUNTER — NON-APPOINTMENT (OUTPATIENT)
Age: 39
End: 2024-04-10

## 2024-04-10 ENCOUNTER — APPOINTMENT (OUTPATIENT)
Dept: OPHTHALMOLOGY | Facility: CLINIC | Age: 39
End: 2024-04-10
Payer: COMMERCIAL

## 2024-04-10 PROCEDURE — 92012 INTRM OPH EXAM EST PATIENT: CPT

## 2024-06-19 ENCOUNTER — NON-APPOINTMENT (OUTPATIENT)
Age: 39
End: 2024-06-19

## 2024-06-19 ENCOUNTER — APPOINTMENT (OUTPATIENT)
Dept: OPHTHALMOLOGY | Facility: CLINIC | Age: 39
End: 2024-06-19
Payer: COMMERCIAL

## 2024-06-19 PROCEDURE — 92012 INTRM OPH EXAM EST PATIENT: CPT

## 2024-06-22 ENCOUNTER — OFFICE (OUTPATIENT)
Dept: URBAN - METROPOLITAN AREA CLINIC 113 | Facility: CLINIC | Age: 39
Setting detail: OPHTHALMOLOGY
End: 2024-06-22
Payer: COMMERCIAL

## 2024-06-22 DIAGNOSIS — H11.432: ICD-10-CM

## 2024-06-22 PROCEDURE — 92012 INTRM OPH EXAM EST PATIENT: CPT | Performed by: OPTOMETRIST

## 2024-06-22 ASSESSMENT — CONFRONTATIONAL VISUAL FIELD TEST (CVF)
OD_FINDINGS: FULL
OS_FINDINGS: FULL

## 2024-12-18 ENCOUNTER — APPOINTMENT (OUTPATIENT)
Dept: OPHTHALMOLOGY | Facility: CLINIC | Age: 39
End: 2024-12-18

## 2025-02-26 ENCOUNTER — NON-APPOINTMENT (OUTPATIENT)
Age: 40
End: 2025-02-26

## 2025-02-26 ENCOUNTER — APPOINTMENT (OUTPATIENT)
Dept: OPHTHALMOLOGY | Facility: CLINIC | Age: 40
End: 2025-02-26
Payer: COMMERCIAL

## 2025-02-26 PROCEDURE — 92250 FUNDUS PHOTOGRAPHY W/I&R: CPT

## 2025-02-26 PROCEDURE — 92014 COMPRE OPH EXAM EST PT 1/>: CPT
